# Patient Record
Sex: FEMALE | Race: BLACK OR AFRICAN AMERICAN | Employment: UNEMPLOYED | URBAN - METROPOLITAN AREA
[De-identification: names, ages, dates, MRNs, and addresses within clinical notes are randomized per-mention and may not be internally consistent; named-entity substitution may affect disease eponyms.]

---

## 2017-04-14 ENCOUNTER — APPOINTMENT (OUTPATIENT)
Dept: NUTRITION | Age: 32
End: 2017-04-14
Payer: MEDICAID

## 2017-04-24 ENCOUNTER — HOSPITAL ENCOUNTER (OUTPATIENT)
Dept: NUTRITION | Age: 32
Discharge: HOME | End: 2017-04-24
Payer: MEDICAID

## 2017-04-24 PROCEDURE — 97802 MEDICAL NUTRITION INDIV IN: CPT

## 2017-04-24 NOTE — PROGRESS NOTES
[address] [place] [address], Lisset Sepulveda  Phone: [phone]  Fax: [phone]   Nutrition Assessment  Medical Nutrition Therapy   Outpatient Initial Evaluation         Patient Name: Js Gilmore : 1985   Treatment Diagnosis: Morbid Obesity Onset Date:    Referral Source: Rafael Julian MD Start of Care [facility]): 2017     Ht:  70 in  cm Wt: 356 lb  kg   BMI: 51.1  BMR   male  BMR female 26     PMHx includes: HTN, high cholesterol, depression, Pseudotumor Cerebri      Medications of Nutritional Significance:   Zofran, Vitamin D     Subjective/Assessment:   26 yo female referred by MD for help with healthy eating and wt loss. Pt experiences nausea, vomiting and headaches due to Pseudotumor Cerebri, which makes it difficult for her to eat regularly especially in the morning. Explained how skipping meals affects metabolism and can be counterproductive to wt loss, discussed rationale behind dietary modificatoin and increased activity. Educated with Balanced Meal model, and set goals to increase protein and decrease carbohydrates/sugar from all sources (including yogurt). Pt currently has a routine of walking her daughter to and from school (~50 mins of walking/day). Answered questions about supplementation; explained aftermarket regulation and encouraged pt to buy from reputable brands. Will f/u in 3 weeks; pt expressed understanding, is motivated to lose wt and compliance is expected. Current Eating Patterns: Pt reports sporadic eating without a pattern; eats based off hunger, but some days does not eat at all. The most she would have is 2 meals plus multiple snacks. Pt has trouble eating in the morning due to nausea and vomiting. She does like Now & Later candies (eats 4 pack every other day).       Handouts/  Information Provided: [x]  Carbohydrates  [x]  Protein  [x]  Fiber  []  Serving Sizes  [x]  Meal and Snack Ideas  []  Food Journals []  Diabetes  [x]  Cholesterol  [x]  Sodium  []  Gen Nutr Guidelines  []  SBGM Guidelines  [x]  Others: Non-starchy vegetable list, carbohydrate sources web   Estimate Needs:   Calories: 2000  Protein: 125 Carbs: 225 Fat: 65   Kcal/day  g/day  g/day  g/day        percent: 25  45  30     Nutritional Goal - To promote lifestyle changes to result in:    [x]  Weight loss  []  Improved diabetic control  [x]  Decreased cholesterol levels  [x]  Decreased blood pressure  []  Weight maintenance []  Preventing any interactions associated with food allergies  []  Adequate weight gain toward goal weight  []  Other:        Recommendations: 1. Always pair carbohydrates with protein. 2. Compare yogurt labels for lower carbohydrate option. 3. Walk an extra 10 minutes per day, for a total of 60 minutes.      Information Reviewed with: pt   Potential Barriers to Learning: none     Dietitian Signature: Daisy Pat MS, RD Date: 4/24/2017   Follow-up: Mon. 5/15/17 at 1:30pm Time: 12:56 PM

## 2017-05-15 ENCOUNTER — HOSPITAL ENCOUNTER (OUTPATIENT)
Dept: NUTRITION | Age: 32
Discharge: HOME | End: 2017-05-15
Payer: MEDICAID

## 2017-05-15 PROCEDURE — 97803 MED NUTRITION INDIV SUBSEQ: CPT

## 2017-05-15 NOTE — PROGRESS NOTES
NUTRITION  DAILY TREATMENT NOTE  Patient Name: Alena Castaneda         Date: 5/15/2017  : 1985    YES Patient  Verified  Insurance: Payor: Charles Reich / Plan: Blaire Trotter / Product Type: Managed Care Medicaid /    Diagnosis:     Morbid Obesity  In time:   1:30p             Out time:   2p   Total Treatment Time (min):   30     SUBJECTIVE    Medication Changes/New allergies or changes in medical history, any new surgeries or procedures? NO    If yes, update Summary List   Subjective Functional Status/Changes:  []  No changes reported   Pt in for follow up doing well; reports more energy and increased activity (started walking 3-4x/week at [facility]). Having Yoplait Light yogurt or breakfast although she still feels nauseous most mornings due to Pseudotumor Cerebri. Has been skipping lunch. Bakari Puente is most balanced-usually baked meat and vegetable. Encouraged some carbohydrate intake, to help meet energy needs. Now & Laters are gone and she doesn't plan to buy anymore, she also has not eaten any icecream since last visit. Current Wt (lbs): 353 Previous Wt (lbs): 356 Wt Change (lbs): - 3     OBJECTIVE    Patient Education:  [x]  Review current plan with patient   []  Other:    Handouts/  Information Provided: []  Carbohydrates  []  Protein  []  Fiber  []  Serving Sizes  []  Fluids  []  General guidelines []  Diabetes  []  Cholesterol  []  Sodium  []  SBGM  []  Food Journals  []  Others:    Estimated Needs:  125 225 65    Calories Protein (gm) CHO (gm) Fat (gm)     ASSESSMENT    [x]  Pt Progressing Well []  Slow Progress []  No Progress    Other:    Understand Dietary       Changes/Recommendations []  No Change [x]  Improving []  N/A   Weight []  No Change [x]  Improving []  N/A   Glucose Levels []  No Change []  Improving []  N/A   Cholesterol Levels []  No Change []  Improving []  N/A     RECOMMENDATIONS    1. Avoid skipping meals, at least have a snack [facility] protein bar). 2. Follow the balanced meal model for portion sizes and meal composition. 3. Continue walking, start timing yourself to measure improvement over time.      PLAN    [x]  Continue on current plan []  Follow-up PRN   []  Discharge due to :    [x]  Next Appt[de-identified] Mon. 6/12/17 at 1:30pm     Dietitian: Dimple Miranda MS, RD    Date: 5/15/2017 Time: 1:47 PM

## 2017-05-25 ENCOUNTER — HOSPITAL ENCOUNTER (EMERGENCY)
Age: 32
Discharge: HOME | End: 2017-05-25
Attending: EMERGENCY MEDICINE | Admitting: EMERGENCY MEDICINE
Payer: MEDICAID

## 2017-05-25 VITALS
WEIGHT: 293 LBS | HEART RATE: 91 BPM | DIASTOLIC BLOOD PRESSURE: 84 MMHG | TEMPERATURE: 98.5 F | HEIGHT: 69 IN | BODY MASS INDEX: 43.4 KG/M2 | OXYGEN SATURATION: 100 % | SYSTOLIC BLOOD PRESSURE: 128 MMHG | RESPIRATION RATE: 16 BRPM

## 2017-05-25 DIAGNOSIS — F32.A DEPRESSION, UNSPECIFIED DEPRESSION TYPE: Primary | ICD-10-CM

## 2017-05-25 DIAGNOSIS — I10 ESSENTIAL HYPERTENSION: ICD-10-CM

## 2017-05-25 LAB
AMPHET UR QL SCN: NEGATIVE
ANION GAP BLD CALC-SCNC: 6 MMOL/L (ref 3–18)
APAP SERPL-MCNC: <2 UG/ML (ref 10–30)
APPEARANCE UR: CLEAR
BACTERIA URNS QL MICRO: ABNORMAL /HPF
BARBITURATES UR QL SCN: NEGATIVE
BASOPHILS # BLD AUTO: 0 K/UL (ref 0–0.06)
BASOPHILS # BLD: 1 % (ref 0–2)
BENZODIAZ UR QL: POSITIVE
BILIRUB UR QL: NEGATIVE
BUN SERPL-MCNC: 9 MG/DL (ref 7–18)
BUN/CREAT SERPL: 8 (ref 12–20)
CALCIUM SERPL-MCNC: 8.5 MG/DL (ref 8.5–10.1)
CANNABINOIDS UR QL SCN: NEGATIVE
CAOX CRY URNS QL MICRO: ABNORMAL
CHLORIDE SERPL-SCNC: 114 MMOL/L (ref 100–108)
CO2 SERPL-SCNC: 23 MMOL/L (ref 21–32)
COCAINE UR QL SCN: NEGATIVE
COLOR UR: YELLOW
CREAT SERPL-MCNC: 1.1 MG/DL (ref 0.6–1.3)
DIFFERENTIAL METHOD BLD: NORMAL
EOSINOPHIL # BLD: 0.1 K/UL (ref 0–0.4)
EOSINOPHIL NFR BLD: 3 % (ref 0–5)
EPITH CASTS URNS QL MICRO: NEGATIVE /LPF (ref 0–5)
ERYTHROCYTE [DISTWIDTH] IN BLOOD BY AUTOMATED COUNT: 14.2 % (ref 11.6–14.5)
ETHANOL SERPL-MCNC: <3 MG/DL (ref 0–3)
GLUCOSE SERPL-MCNC: 97 MG/DL (ref 74–99)
GLUCOSE UR STRIP.AUTO-MCNC: NEGATIVE MG/DL
HCT VFR BLD AUTO: 39.5 % (ref 35–45)
HDSCOM,HDSCOM: ABNORMAL
HGB BLD-MCNC: 12.6 G/DL (ref 12–16)
HGB UR QL STRIP: ABNORMAL
KETONES UR QL STRIP.AUTO: NEGATIVE MG/DL
LEUKOCYTE ESTERASE UR QL STRIP.AUTO: NEGATIVE
LYMPHOCYTES # BLD AUTO: 30 % (ref 21–52)
LYMPHOCYTES # BLD: 1.5 K/UL (ref 0.9–3.6)
MCH RBC QN AUTO: 28.7 PG (ref 24–34)
MCHC RBC AUTO-ENTMCNC: 31.9 G/DL (ref 31–37)
MCV RBC AUTO: 90 FL (ref 74–97)
METHADONE UR QL: NEGATIVE
MONOCYTES # BLD: 0.4 K/UL (ref 0.05–1.2)
MONOCYTES NFR BLD AUTO: 7 % (ref 3–10)
NEUTS SEG # BLD: 3 K/UL (ref 1.8–8)
NEUTS SEG NFR BLD AUTO: 59 % (ref 40–73)
NITRITE UR QL STRIP.AUTO: NEGATIVE
OPIATES UR QL: NEGATIVE
PCP UR QL: NEGATIVE
PH UR STRIP: 6 [PH] (ref 5–8)
PLATELET # BLD AUTO: 204 K/UL (ref 135–420)
PMV BLD AUTO: 10.9 FL (ref 9.2–11.8)
POTASSIUM SERPL-SCNC: 3.5 MMOL/L (ref 3.5–5.5)
PROT UR STRIP-MCNC: NEGATIVE MG/DL
RBC # BLD AUTO: 4.39 M/UL (ref 4.2–5.3)
RBC #/AREA URNS HPF: ABNORMAL /HPF (ref 0–5)
SALICYLATES SERPL-MCNC: <2.8 MG/DL (ref 2.8–20)
SODIUM SERPL-SCNC: 143 MMOL/L (ref 136–145)
SP GR UR REFRACTOMETRY: 1.03 (ref 1–1.03)
UROBILINOGEN UR QL STRIP.AUTO: 1 EU/DL (ref 0.2–1)
WBC # BLD AUTO: 5.1 K/UL (ref 4.6–13.2)
WBC URNS QL MICRO: NEGATIVE /HPF (ref 0–4)

## 2017-05-25 PROCEDURE — 80048 BASIC METABOLIC PNL TOTAL CA: CPT | Performed by: EMERGENCY MEDICINE

## 2017-05-25 PROCEDURE — 99285 EMERGENCY DEPT VISIT HI MDM: CPT

## 2017-05-25 PROCEDURE — 74011250637 HC RX REV CODE- 250/637: Performed by: PHYSICIAN ASSISTANT

## 2017-05-25 PROCEDURE — 81001 URINALYSIS AUTO W/SCOPE: CPT | Performed by: NURSE PRACTITIONER

## 2017-05-25 PROCEDURE — 85025 COMPLETE CBC W/AUTO DIFF WBC: CPT | Performed by: EMERGENCY MEDICINE

## 2017-05-25 PROCEDURE — 80307 DRUG TEST PRSMV CHEM ANLYZR: CPT | Performed by: NURSE PRACTITIONER

## 2017-05-25 PROCEDURE — 80307 DRUG TEST PRSMV CHEM ANLYZR: CPT | Performed by: EMERGENCY MEDICINE

## 2017-05-25 PROCEDURE — 74011250637 HC RX REV CODE- 250/637: Performed by: NURSE PRACTITIONER

## 2017-05-25 RX ORDER — TOPIRAMATE 100 MG/1
TABLET, FILM COATED ORAL 2 TIMES DAILY
COMMUNITY

## 2017-05-25 RX ORDER — AMLODIPINE BESYLATE 10 MG/1
TABLET ORAL DAILY
COMMUNITY

## 2017-05-25 RX ORDER — ONDANSETRON 8 MG/1
8 TABLET, ORALLY DISINTEGRATING ORAL
Status: COMPLETED | OUTPATIENT
Start: 2017-05-25 | End: 2017-05-25

## 2017-05-25 RX ORDER — GABAPENTIN 300 MG/1
300 CAPSULE ORAL 3 TIMES DAILY
COMMUNITY

## 2017-05-25 RX ORDER — DIAZEPAM 5 MG/1
5 TABLET ORAL
COMMUNITY

## 2017-05-25 RX ORDER — CLONAZEPAM 1 MG/1
TABLET ORAL 2 TIMES DAILY
COMMUNITY

## 2017-05-25 RX ORDER — ALPRAZOLAM 1 MG/1
TABLET ORAL
COMMUNITY

## 2017-05-25 RX ORDER — LAMOTRIGINE 100 MG/1
TABLET ORAL DAILY
COMMUNITY

## 2017-05-25 RX ADMIN — ONDANSETRON 8 MG: 8 TABLET, ORALLY DISINTEGRATING ORAL at 15:45

## 2017-05-25 RX ADMIN — ONDANSETRON 8 MG: 8 TABLET, ORALLY DISINTEGRATING ORAL at 20:50

## 2017-05-25 NOTE — CONSULTS
Chief Complaint: ED Telepsychiatry consultation   History of Present Illness: This pt is a 27 yr old female who reports a history of Bipolar disorder. She came to the ED today upon recommendation of her therapist. Apparently the pt took extra medications today, presented with slurred speech at her therapist office and the therapist recommended that she come here for evaluation. In the ED pt has been somewhat angry and with concerning statements ie she stated \"if they don't let me leave, I'm gonna throw myself on the floor and stop breathing. I'm here because I took more of my meds than I was supposed to and if I had them, I'd take some more right now! \"      Telepsychiatry was consulted for assessment and recommendations for management. Upon interview pt is cooperative and calm. She says that she has a hx of Bipolar disorder. She has a therapist and psychiatrist. She sees her therapist every week since October. She says that recently she has been feeling irritable (she says that she has felt like this all her life) and has many recent stressors - ie her friend recently tried to commit suicide, she is having issues with her child's father. As such she says that today she took 3 xanax and 1 valium. She says that normally she takes these medications as needed whenever she was feeling anxious. She says that she took the medications 'not to feel, not to show emotion.' When asked if she took the medications in any attempt to hurt or kill herself she says \"I didn't care if it happened or not' when referring to the risk of death or overdose. She says that she did not know the risk of abusing her medications. Of concern is that the pt is on multiple medications prescribed by different prescribers (neurologist, psychiatrist ): klonopin, valium, xanax (she says that this medicatiosn was d/c but she conitnues to take it), lamictal, topamax, recently neurontin and gabapentin were added per pt.            Collateral: obtained from chart  Psychiatric History/Treatment History:   -Inpatient: yes, last time Oct of last year  -Outpatient: yes  -History of suicide attempts: hx of cutting, taking pills      Drug/Alcohol History: Pt denies . UDS + benzos    Medical History:   -Medical problems: per pt - HTN, pseudotumor cerebri  -Current medications: see chart      Social History: pt lives with her 11year old daughter    Mental Status Exam:   Appearance and attire: hospital attire  Attitude and behavior: cooperative but may be minimizing her sx  Speech: clear, coherent   Affect and mood: depressed, irritable  Association and thought processes: goal directed   Thought content: no overt delusions. Passive death wishes  Perception: pt does not appear to be responding to internal stimuli. Sensorium, memory, and orientation: AAOx3   Intellectual functioning: average   Insight and judgment: impaired      Diagnosis:   Bipolar disorder by hx  Consider anxiolytic use disorder    Treatment Recommendations: The patient denies that her taking extra benzos was an active suicide attempt. At the same time she says that she did not care if she  via taking the pills. She says that she was unaware of the risks of abusing benzos - she is either minimizing her knowledge of this or has limited insight. There are many safety concerns regarding this pt: her medication regimen (even her prescribed medcations are of concern, as she is on multiple benzos and multiple mood stabilizers), the fact that she is abusing her medications (ie taking extra xanax, taking xanax even after it was discontinued), her recent stressors, her continued bipolar sx ie irritability, her passive death wishes. Given these safety concerns I strongly recommeded to the pt that she be admitted to the in psychaitric facility so that her medications can be reviewed and that her sx can be monitored. Pt, however, cooperatively declined voluntary admssion.     Given the above safety concerns, I therefore recommend that she be evaluated by the [facility] for possible TDO. In the event that the pt does get admitted I recommend the following regarding her medications: all benzos should be discontinued: she should be placed on benzo detox protocol with vitals and CIWA q4hrs and ativan 2mg PO q4hs prn only for withdrawal sx (hold for somnolence, ataxia or dysarthria). Her other psych medications should be verified by the pharmacy. Once the doses are confirmed and pt confirms she is taking them, they can be continued (there is risk of abruptly stopping the mood stabilizers). Once admitted, the inpt treatment team can determine if any of those medications should be adjusted/tapered/discontinued.        Sanjiv Taylor MD  Telepsychiatry

## 2017-05-25 NOTE — ED NOTES
Purposeful rounding completed:    Side rails up x 2:  YES  Bed in low position and wheels locked: YES  Call bell within reach: YES  Comfort addressed: YES    Toileting needs addressed: YES  Plan of care reviewed/updated with patient and or family members: YES  IV site assessed: N/A  Pain assessed and addressed: YES, 1

## 2017-05-25 NOTE — ED NOTES
Pt remains agitated. Multiple demands. States, \"if they TDO me, yall can keep my stuff. I'm running out of here! \"

## 2017-05-25 NOTE — ED TRIAGE NOTES
Patient took meds  To stop anxiety. \"I just didn't want to feel\". Was at therapy appointment and speech was noted slurred . Sent here. Admits to depression. Denies HI. meds changed yesterday by psych.  Refuses in patient

## 2017-05-25 NOTE — ED NOTES
Pt states that she took xanax and valium along with all of her other medications. She states, \"when I went to my psych appt today, they noticed that I didn't have any shoes on, just these socks\".

## 2017-05-25 NOTE — ED NOTES
Pt continues making multiple requests of staff. Attempting to manipulate staff into giving her her belongings, specifically her phone. Pt made aware that she may not have her belongings at this time.

## 2017-05-25 NOTE — ED PROVIDER NOTES
HPI Comments: Jose Gallagher is a 27year old female who presents to the ED after being sent over to [facility] [address] [address] by her counselor. Pt states she intentionally misused her prescription medication because \"I just didn't want to feel anything anymore. \"   States she took several extra Valium tabs in addition to her regularly prescribed Xanax, Klonopin, Lamictal, Topamax, Norvasc, Neurontin. She denies trying to hurt herself today, but admits she has tried to hurt herself two times in the past.      Patient is a 27 y.o. female presenting with mental health disorder and Ingested Medication. The history is provided by the patient. History limited by: no communication barrier. Mental Health Problem    This is a new problem. The current episode started 1 to 2 hours ago. Associated symptoms include self-injury. Mental status baseline is normal.  Risk factors include overdose. Drug Overdose          Past Medical History:   Diagnosis Date    HTN (hypertension)     Psychiatric disorder     depression       History reviewed. No pertinent surgical history. History reviewed. No pertinent family history. Social History     Social History    Marital status: UNKNOWN     Spouse name: N/A    Number of children: N/A    Years of education: N/A     Occupational History    Not on file. Social History Main Topics    Smoking status: Never Smoker    Smokeless tobacco: Not on file    Alcohol use No    Drug use: No    Sexual activity: No     Other Topics Concern    Not on file     Social History Narrative         ALLERGIES: Review of patient's allergies indicates no known allergies. Review of Systems   Constitutional: Negative. HENT: Negative. Eyes: Negative. Respiratory: Negative. Cardiovascular: Negative. Gastrointestinal: Negative. Endocrine: Negative. Genitourinary: Negative. Musculoskeletal: Negative. Skin: Negative. Allergic/Immunologic: Negative. Neurological: Negative. Hematological: Negative. Psychiatric/Behavioral: Positive for self-injury. Vitals:    05/25/17 1303 05/25/17 1328   BP: (!) 148/97    Pulse: 88    Resp: 17    Temp: 99 °F (37.2 °C)    SpO2: 100%    Weight: 152.4 kg (336 lb)    Height:  5' 9\" (1.753 m)            Physical Exam   Constitutional: She is oriented to person, place, and time. She appears well-developed and well-nourished. No distress. HENT:   Head: Normocephalic and atraumatic. Eyes: EOM are normal. Pupils are equal, round, and reactive to light. Neck: Normal range of motion. Neck supple. Cardiovascular: Normal rate, regular rhythm and normal heart sounds. Pulmonary/Chest: Effort normal and breath sounds normal. No respiratory distress. She has no wheezes. She has no rales. Abdominal: Soft. Bowel sounds are normal. There is no tenderness. There is no rebound. Genitourinary:   Genitourinary Comments: NE   Musculoskeletal: Normal range of motion. Neurological: She is alert and oriented to person, place, and time. No cranial nerve deficit. She exhibits normal muscle tone. Coordination normal.   Skin: Skin is warm and dry. Psychiatric:   Pt appears to be sluggish, but seems to be at her baseline. Nursing note and vitals reviewed. MDM  Number of Diagnoses or Management Options  Diagnosis management comments: CONSULT:  Discussed the Pt with Dr Anjali Garza, who agreed to evluate the Pt. Eun Ybarra NP  3:56 PM    CONSULT:  Discussed the Pt with DR Anjali Garza, who recommended TDO for this PT. Eun Ybarra NP  4:23 PM    CONSULT:   Discussed the Pt with Beka at [facility], who advised he would have someone out to evaluate the Pt as soon sa possible.   Eun Ybarra NP  5:00 PM                     Amount and/or Complexity of Data Reviewed  Clinical lab tests: ordered and reviewed    Risk of Complications, Morbidity, and/or Mortality  Presenting problems: moderate  Diagnostic procedures: moderate  Management options: moderate      ED Course       Procedures    PROGRESS NOTE:  Care of the Pt has been turned over to BERENICE Villareal PA-C at time of shift change. Pt pending evaluation by [facility] for TDO.   Leo Pimentel NP  7:11 PM

## 2017-05-26 NOTE — ED NOTES
I have reviewed discharge instructions with the patient. The patient verbalized understanding. Patient armband removed and shredded. Safety contract signed. Pt belongings returned to her.

## 2017-05-26 NOTE — ED NOTES
8:11 PM Introduced myself to pt and let her know that I would be assuming her care. Pt reaffirms at this point that she is not suicidal or homicidal at this point, stating that she just wanted to feel better. Pt is in good spirits at this time awaiting [facility]      9:00 PM Pt states that nausea has returned and requests more Zofran. 9:49 PM CSB just met with patient and states that she is not homicidal or suicidal and does not meet criteria for TDO. She has follow up with both psychology and therapist next week. Pts mother agrees to manage patients home medications. Pt will contract for safety and be discharge home.

## 2017-05-26 NOTE — DISCHARGE INSTRUCTIONS
Depression and Chronic Disease: Care Instructions  Your Care Instructions  A chronic disease is one that you have for a long time. Some chronic diseases can be controlled, but they usually cannot be cured. Depression is common in people with chronic diseases, but it often goes unnoticed. Many people have concerns about seeking treatment for a mental health problem. You may think it's a sign of weakness, or you don't want people to know about it. It's important to overcome these reasons for not seeking treatment. Treating depression or anxiety is good for your health. Follow-up care is a key part of your treatment and safety. Be sure to make and go to all appointments, and call your doctor if you are having problems. It's also a good idea to know your test results and keep a list of the medicines you take. How can you care for yourself at home? Watch for symptoms of depression  The symptoms of depression are often subtle at first. You may think they are caused by your disease rather than depression. Or you may think it is normal to be depressed when you have a chronic disease. If you are depressed you may:  · Feel sad or hopeless. · Feel guilty or worthless. · Not enjoy the things you used to enjoy. · Feel hopeless, as though life is not worth living. · Have trouble thinking or remembering. · Have low energy, and you may not eat or sleep well. · Pull away from others. · Think often about death or killing yourself. (Keep the numbers for these national suicide hotlines: 1-897-665-TALK [1-651.161.3910] and 2-822-IJQOSQQ [1-415.510.4925]. )  Get treatment  By treating your depression, you can feel more hopeful and have more energy. If you feel better, you may take better care of yourself, so your health may improve. · Talk to your doctor if you have any changes in mood during treatment for your disease. · Ask your doctor for help.  Counseling, antidepressant medicine, or a combination of the two can help most people with depression. Often a combination works best. Counseling can also help you cope with having a chronic disease. When should you call for help? Call 911 anytime you think you may need emergency care. For example, call if:  · You feel like hurting yourself or someone else. · Someone you know has depression and is about to attempt or is attempting suicide. Call your doctor now or seek immediate medical care if:  · You hear voices. · Someone you know has depression and:  ¨ Starts to give away his or her possessions. ¨ Uses illegal drugs or drinks alcohol heavily. ¨ Talks or writes about death, including writing suicide notes or talking about guns, knives, or pills. ¨ Starts to spend a lot of time alone. ¨ Acts very aggressively or suddenly appears calm. Watch closely for changes in your health, and be sure to contact your doctor if:  · You do not get better as expected. Where can you learn more? Go to http://zhen-jennifer.info/. Enter H557 in the search box to learn more about \"Depression and Chronic Disease: Care Instructions. \"  Current as of: July 26, 2016  Content Version: 11.2  © 2212-7627 [employer]. Care instructions adapted under license by [employer] (which disclaims liability or warranty for this information). If you have questions about a medical condition or this instruction, always ask your healthcare professional. [address] any warranty or liability for your use of this information.

## 2017-06-12 ENCOUNTER — APPOINTMENT (OUTPATIENT)
Dept: NUTRITION | Age: 32
End: 2017-06-12

## 2017-06-19 ENCOUNTER — APPOINTMENT (OUTPATIENT)
Dept: NUTRITION | Age: 32
End: 2017-06-19